# Patient Record
Sex: FEMALE | Race: WHITE | HISPANIC OR LATINO | Employment: FULL TIME | URBAN - METROPOLITAN AREA
[De-identification: names, ages, dates, MRNs, and addresses within clinical notes are randomized per-mention and may not be internally consistent; named-entity substitution may affect disease eponyms.]

---
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
## 2018-06-01 PROBLEM — F32.9 MAJOR DEPRESSION: Status: ACTIVE | Noted: 2018-06-01

## 2018-06-01 PROBLEM — F31.9 BIPOLAR DEPRESSION (HCC): Status: ACTIVE | Noted: 2018-06-01

## 2018-06-02 PROBLEM — E66.01 MORBID OBESITY (HCC): Status: ACTIVE | Noted: 2018-06-02

## 2018-06-02 PROBLEM — Z78.9 CONSUMES A VEGAN DIET: Status: ACTIVE | Noted: 2018-06-02

## 2018-06-04 PROBLEM — T78.40XA ALLERGY: Status: ACTIVE | Noted: 2018-06-04

## 2018-09-29 PROBLEM — F33.2 MAJOR DEPRESSIVE DISORDER, RECURRENT EPISODE, SEVERE (HCC): Status: ACTIVE | Noted: 2018-09-29

## 2018-09-29 PROBLEM — D64.9 ANEMIA, NORMOCYTIC NORMOCHROMIC: Status: ACTIVE | Noted: 2018-09-29

## 2018-09-29 PROBLEM — E66.9 OBESITY: Status: ACTIVE | Noted: 2018-06-02

## 2018-12-17 PROBLEM — F31.9 BIPOLAR DISORDER (HCC): Status: ACTIVE | Noted: 2018-12-17

## 2018-12-18 PROBLEM — F31.32 BIPOLAR AFFECTIVE DISORDER, CURRENTLY DEPRESSED, MODERATE (HCC): Status: ACTIVE | Noted: 2018-12-17

## 2018-12-18 PROBLEM — Z30.41 ORAL CONTRACEPTIVE USE: Status: ACTIVE | Noted: 2018-12-18

## 2021-07-22 PROBLEM — F31.9 BIPOLAR I DISORDER WITH DEPRESSION (HCC): Status: ACTIVE | Noted: 2021-07-22

## 2021-09-07 ENCOUNTER — HOSPITAL ENCOUNTER (EMERGENCY)
Age: 31
Discharge: PSYCHIATRIC OR SUBSTANCE USE FACILITY | End: 2021-09-08
Attending: EMERGENCY MEDICINE

## 2021-09-07 DIAGNOSIS — R45.851 SUICIDAL IDEATION: Primary | ICD-10-CM

## 2021-09-07 LAB
ALBUMIN SERPL-MCNC: 3.8 G/DL (ref 3.6–5.1)
ALBUMIN/GLOB SERPL: 1 {RATIO} (ref 1–2.4)
ALP SERPL-CCNC: 121 UNITS/L (ref 45–117)
ALT SERPL-CCNC: 103 UNITS/L
AMPHETAMINES UR QL SCN>500 NG/ML: NEGATIVE
ANION GAP SERPL CALC-SCNC: 15 MMOL/L (ref 10–20)
APPEARANCE UR: CLEAR
AST SERPL-CCNC: 60 UNITS/L
BACTERIA #/AREA URNS HPF: ABNORMAL /HPF
BARBITURATES UR QL SCN>200 NG/ML: NEGATIVE
BASOPHILS # BLD: 0 K/MCL (ref 0–0.3)
BASOPHILS NFR BLD: 0 %
BENZODIAZ UR QL SCN>200 NG/ML: NEGATIVE
BILIRUB SERPL-MCNC: 0.3 MG/DL (ref 0.2–1)
BILIRUB UR QL STRIP: NEGATIVE
BUN SERPL-MCNC: 12 MG/DL (ref 6–20)
BUN/CREAT SERPL: 17 (ref 7–25)
BZE UR QL SCN>150 NG/ML: NEGATIVE
CALCIUM SERPL-MCNC: 9 MG/DL (ref 8.4–10.2)
CANNABINOIDS UR QL SCN>50 NG/ML: NEGATIVE
CHLORIDE SERPL-SCNC: 105 MMOL/L (ref 98–107)
CO2 SERPL-SCNC: 25 MMOL/L (ref 21–32)
COLOR UR: YELLOW
CREAT SERPL-MCNC: 0.7 MG/DL (ref 0.51–0.95)
DEPRECATED RDW RBC: 39.2 FL (ref 39–50)
EOSINOPHIL # BLD: 0.1 K/MCL (ref 0–0.5)
EOSINOPHIL NFR BLD: 2 %
ERYTHROCYTE [DISTWIDTH] IN BLOOD: 13 % (ref 11–15)
ETHANOL SERPL-MCNC: NORMAL MG/DL
FASTING DURATION TIME PATIENT: ABNORMAL H
GFR SERPLBLD BASED ON 1.73 SQ M-ARVRAT: >90 ML/MIN
GLOBULIN SER-MCNC: 3.7 G/DL (ref 2–4)
GLUCOSE SERPL-MCNC: 104 MG/DL (ref 65–99)
GLUCOSE UR STRIP-MCNC: NEGATIVE MG/DL
HCT VFR BLD CALC: 39.7 % (ref 36–46.5)
HGB BLD-MCNC: 12.9 G/DL (ref 12–15.5)
HGB UR QL STRIP: ABNORMAL
HYALINE CASTS #/AREA URNS LPF: ABNORMAL /LPF
IMM GRANULOCYTES # BLD AUTO: 0.1 K/MCL (ref 0–0.2)
IMM GRANULOCYTES # BLD: 1 %
KETONES UR STRIP-MCNC: NEGATIVE MG/DL
LEUKOCYTE ESTERASE UR QL STRIP: NEGATIVE
LYMPHOCYTES # BLD: 3.4 K/MCL (ref 1–4.8)
LYMPHOCYTES NFR BLD: 40 %
MCH RBC QN AUTO: 27.3 PG (ref 26–34)
MCHC RBC AUTO-ENTMCNC: 32.5 G/DL (ref 32–36.5)
MCV RBC AUTO: 84.1 FL (ref 78–100)
MONOCYTES # BLD: 0.4 K/MCL (ref 0.3–0.9)
MONOCYTES NFR BLD: 5 %
NEUTROPHILS # BLD: 4.4 K/MCL (ref 1.8–7.7)
NEUTROPHILS NFR BLD: 52 %
NITRITE UR QL STRIP: NEGATIVE
NRBC BLD MANUAL-RTO: 0 /100 WBC
OPIATES UR QL SCN>300 NG/ML: NEGATIVE
PCP UR QL SCN>25 NG/ML: NEGATIVE
PH UR STRIP: 6.5 [PH] (ref 5–7)
PLATELET # BLD AUTO: 370 K/MCL (ref 140–450)
POTASSIUM SERPL-SCNC: 4.2 MMOL/L (ref 3.4–5.1)
PROT SERPL-MCNC: 7.5 G/DL (ref 6.4–8.2)
PROT UR STRIP-MCNC: 30 MG/DL
RBC # BLD: 4.72 MIL/MCL (ref 4–5.2)
RBC #/AREA URNS HPF: ABNORMAL /HPF
SARS-COV-2 RNA RESP QL NAA+PROBE: NOT DETECTED
SERVICE CMNT-IMP: NORMAL
SERVICE CMNT-IMP: NORMAL
SODIUM SERPL-SCNC: 141 MMOL/L (ref 135–145)
SP GR UR STRIP: 1.02 (ref 1–1.03)
SQUAMOUS #/AREA URNS HPF: ABNORMAL /HPF
UROBILINOGEN UR STRIP-MCNC: 0.2 MG/DL
WBC # BLD: 8.4 K/MCL (ref 4.2–11)
WBC #/AREA URNS HPF: ABNORMAL /HPF

## 2021-09-07 PROCEDURE — 85025 COMPLETE CBC W/AUTO DIFF WBC: CPT | Performed by: EMERGENCY MEDICINE

## 2021-09-07 PROCEDURE — 82077 ASSAY SPEC XCP UR&BREATH IA: CPT | Performed by: EMERGENCY MEDICINE

## 2021-09-07 PROCEDURE — 87635 SARS-COV-2 COVID-19 AMP PRB: CPT | Performed by: EMERGENCY MEDICINE

## 2021-09-07 PROCEDURE — 36415 COLL VENOUS BLD VENIPUNCTURE: CPT

## 2021-09-07 PROCEDURE — 80053 COMPREHEN METABOLIC PANEL: CPT | Performed by: EMERGENCY MEDICINE

## 2021-09-07 PROCEDURE — 99285 EMERGENCY DEPT VISIT HI MDM: CPT

## 2021-09-07 PROCEDURE — C9803 HOPD COVID-19 SPEC COLLECT: HCPCS

## 2021-09-07 PROCEDURE — 81001 URINALYSIS AUTO W/SCOPE: CPT | Performed by: EMERGENCY MEDICINE

## 2021-09-07 PROCEDURE — 80307 DRUG TEST PRSMV CHEM ANLYZR: CPT | Performed by: EMERGENCY MEDICINE

## 2021-09-07 RX ORDER — 0.9 % SODIUM CHLORIDE 0.9 %
2 VIAL (ML) INJECTION EVERY 12 HOURS SCHEDULED
Status: DISCONTINUED | OUTPATIENT
Start: 2021-09-07 | End: 2021-09-08 | Stop reason: HOSPADM

## 2021-09-08 VITALS
RESPIRATION RATE: 17 BRPM | WEIGHT: 288.8 LBS | HEIGHT: 66 IN | HEART RATE: 80 BPM | BODY MASS INDEX: 46.41 KG/M2 | TEMPERATURE: 97.8 F | OXYGEN SATURATION: 100 % | DIASTOLIC BLOOD PRESSURE: 82 MMHG | SYSTOLIC BLOOD PRESSURE: 142 MMHG

## 2021-09-08 PROBLEM — F31.4 SEVERE DEPRESSED BIPOLAR I DISORDER WITHOUT PSYCHOTIC FEATURES (HCC): Status: ACTIVE | Noted: 2018-12-17

## 2021-09-08 ASSESSMENT — COGNITIVE AND FUNCTIONAL STATUS - GENERAL
ORIENTATION: ORIENTED (PERSON/PLACE/TIME)
ATTENTION_CALCULATED: MAINTAINS ATTENTION
BEHAVIOR: APPROPRIATE TO SITUATION
MOTOR_BEHAVIOR-AGITATION_CALCULATED: CALM AND PURPOSEFUL
MOOD: UNREMARKABLE
AFFECT: APPROPRIATE TO SITUATION
PERCEPTUAL_MISINTERPRETATIONS_HALLUCINATIONS: CLEAR REALITY BASED PERCEPTIONS
MOTOR_BEHAVIOR-RETARDATION_CALCULATED: CALM AND PURPOSEFUL
MEMORY: INTACT
SPEECH: CLEAR/UNDERSTANDABLE
LEVEL_OF_CONSCIOUSNESS_CALCULATED: ALERT

## 2021-09-08 ASSESSMENT — PAIN SCALES - GENERAL
PAINLEVEL_OUTOF10: 0
PAINLEVEL_OUTOF10: 0

## 2021-09-22 PROBLEM — F31.9 BIPOLAR DEPRESSION (HCC): Status: RESOLVED | Noted: 2018-06-01 | Resolved: 2021-09-22

## 2021-09-22 PROBLEM — D64.9 ANEMIA, NORMOCYTIC NORMOCHROMIC: Status: RESOLVED | Noted: 2018-09-29 | Resolved: 2021-09-22

## 2021-09-22 PROBLEM — F31.9 BIPOLAR I DISORDER WITH DEPRESSION (HCC): Status: RESOLVED | Noted: 2021-07-22 | Resolved: 2021-09-22

## 2021-09-22 NOTE — PROGRESS NOTES
Wellness Exam    CC: Patient is presenting for a wellness exam    HPI:   Concerns: new to our office. Currently in intensive outpatient treatment for bipolar with severe depression, post suicidal ideation.  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 , Disp: 60 tablet, Rfl: 0  traZODone 50 MG Oral Tab, Take 1 tablet (50 mg total) by mouth nightly as needed for Sleep., Mague Noriega friction rub heard. Pulmonary/Chest: Effort normal and breath sounds normal bilaterally. She has no wheezes or rales. Breasts: deferred  Abdominal: Soft. Bowel sounds are normal. There is no tenderness. No HSM. Musculoskeletal: Normal range of motion. diet  Patient/Caregiver Education:  Patient/Caregiver Education: There are no barriers to learning. Medical education done. Outcome: Patient verbalizes understanding.        Educated by: EDDA Juarez

## 2021-09-27 NOTE — PROGRESS NOTES
HPI:    Patient ID: Deedee Jiemnez is a 27year old female. Patient presents with:  Immunization/Injection: update immunizations      She is in need for immunization. We reviewed labs today.  She did discuss elevation with psychiatrist. Take 1 tablet (300 mg total) by mouth 2 (two) times daily. 60 tablet 0   • traZODone 50 MG Oral Tab Take 1 tablet (50 mg total) by mouth nightly as needed for Sleep. 30 tablet 0   • METHYLFOLATE OR Take by mouth.        Allergies:  Dander                  A SpO2 96%   BMI 48.26 kg/m²   Physical Exam  Vitals and nursing note reviewed. Constitutional:       Appearance: She is obese. Cardiovascular:      Rate and Rhythm: Normal rate and regular rhythm.    Pulmonary:      Effort: Pulmonary effort is normal.

## 2021-10-06 NOTE — TELEPHONE ENCOUNTER
Received fax from [facility] update on request has been approved    Paper placed in triage

## 2021-10-14 NOTE — PROGRESS NOTES
Discussed results with pt. Understanding expressed. She was advised that Dr. Ramandeep Camargo' office will be in contact with her.

## 2022-02-02 ENCOUNTER — WALK IN (OUTPATIENT)
Dept: URGENT CARE | Age: 32
End: 2022-02-02
Attending: EMERGENCY MEDICINE

## 2022-02-02 ENCOUNTER — HOSPITAL ENCOUNTER (OUTPATIENT)
Dept: GENERAL RADIOLOGY | Age: 32
Discharge: HOME | End: 2022-02-02
Attending: EMERGENCY MEDICINE

## 2022-02-02 VITALS
RESPIRATION RATE: 20 BRPM | HEIGHT: 65 IN | OXYGEN SATURATION: 99 % | SYSTOLIC BLOOD PRESSURE: 118 MMHG | BODY MASS INDEX: 48.32 KG/M2 | WEIGHT: 290 LBS | TEMPERATURE: 97.9 F | DIASTOLIC BLOOD PRESSURE: 78 MMHG | HEART RATE: 105 BPM

## 2022-02-02 DIAGNOSIS — J06.9 VIRAL URI: ICD-10-CM

## 2022-02-02 DIAGNOSIS — R05.9 COUGH: Primary | ICD-10-CM

## 2022-02-02 DIAGNOSIS — R05.9 COUGH: ICD-10-CM

## 2022-02-02 PROCEDURE — 71046 X-RAY EXAM CHEST 2 VIEWS: CPT

## 2022-02-02 PROCEDURE — 99213 OFFICE O/P EST LOW 20 MIN: CPT

## 2022-02-02 RX ORDER — BUPROPION HYDROCHLORIDE 150 MG/1
150 TABLET ORAL EVERY MORNING
COMMUNITY
Start: 2022-01-31 | End: 2023-06-07 | Stop reason: ALTCHOICE

## 2022-02-02 RX ORDER — LITHIUM CARBONATE 300 MG/1
450 TABLET, FILM COATED, EXTENDED RELEASE ORAL DAILY
COMMUNITY
Start: 2022-01-31

## 2022-02-02 RX ORDER — LAMOTRIGINE 200 MG/1
200 TABLET ORAL DAILY
COMMUNITY
Start: 2022-01-31

## 2022-02-02 RX ORDER — GABAPENTIN 300 MG/1
300 CAPSULE ORAL PRN
COMMUNITY
Start: 2021-09-23

## 2022-02-02 RX ORDER — BENZONATATE 100 MG/1
200 CAPSULE ORAL 3 TIMES DAILY PRN
Qty: 20 CAPSULE | Refills: 0 | Status: SHIPPED | OUTPATIENT
Start: 2022-02-02 | End: 2023-06-07 | Stop reason: ALTCHOICE

## 2022-02-02 ASSESSMENT — ENCOUNTER SYMPTOMS
SHORTNESS OF BREATH: 0
CONFUSION: 0
ABDOMINAL PAIN: 0
COUGH: 1
CHILLS: 0
BACK PAIN: 0
HEADACHES: 0
FEVER: 0
SINUS PAIN: 0
VOMITING: 0
NAUSEA: 0

## 2022-10-10 PROBLEM — Z30.41 ORAL CONTRACEPTIVE USE: Status: RESOLVED | Noted: 2018-12-18 | Resolved: 2022-10-10

## 2022-12-17 PROBLEM — R79.89 ELEVATED LFTS: Status: ACTIVE | Noted: 2022-12-17

## 2022-12-17 PROBLEM — E11.9 TYPE 2 DIABETES MELLITUS WITHOUT COMPLICATION, WITHOUT LONG-TERM CURRENT USE OF INSULIN (HCC): Status: ACTIVE | Noted: 2022-12-17

## 2022-12-18 NOTE — TELEPHONE ENCOUNTER
Pt paged to let us know that Verlon Girt was $170/month. Switched Verlon Girt to [place]. Pt will update the office if the medication is too expensive.

## 2022-12-19 NOTE — TELEPHONE ENCOUNTER
Per Jeni Kussmaul, APN: The itchiness is most likely related to her high sugars. Once we get the sugars under control it should provide relief. Could finish the metrinidazole gel to see if it provides some relief. Pt notified.

## 2022-12-19 NOTE — TELEPHONE ENCOUNTER
From: Roger Estes  To: GERALD Pearson  Sent: 12/18/2022 2:21 PM CST  Subject: Question regarding VAGINITIS/VAGINOSIS, DNA PROBE    Since everything came out negative, should I still take the medication?

## 2023-01-06 NOTE — TELEPHONE ENCOUNTER
From: Dorian Escalante  To: GERALD Oh  Sent: 1/5/2023 6:56 PM CST  Subject: When do I make an appointment? I remember I was told to make an appointment to check to see if the dosage of metformin was working.  I don't remember if it was 2 weeks or 2 months

## 2023-01-06 NOTE — TELEPHONE ENCOUNTER
From: Jeff Bonds  Sent: 1/6/2023 10:16 AM CST  To: [facility] Clinical Staff  Subject: When do I make an appointment? Yes, please. It would help me out a lot. Thank you so much!

## 2023-01-06 NOTE — TELEPHONE ENCOUNTER
empagliflozin 10 MG Oral Tab  Last time medication was refilled 12/18/22  Quantity and # of refills 30 tab  Last OV 12/16/22  Next OV no appt scheduled

## 2023-01-06 NOTE — TELEPHONE ENCOUNTER
From: Dorian Escalante  Sent: 1/6/2023 9:11 AM CST  To: [facility] Clinical Staff  Subject: When do I make an appointment?     Do I have to make another appointment to get more jardiance

## 2023-02-10 NOTE — TELEPHONE ENCOUNTER
Per Aliyah BAÑUELOS jardiance most likely cause as it gets rid of sugar through the urine, complete labs mid march    D/w pt above she expressed understanding.

## 2023-03-02 PROBLEM — N92.0 MENORRHAGIA WITH REGULAR CYCLE: Status: ACTIVE | Noted: 2023-03-02

## 2023-03-02 PROBLEM — N93.9 ABNORMAL UTERINE BLEEDING (AUB): Status: ACTIVE | Noted: 2023-03-02

## 2023-03-02 PROBLEM — N94.10 DYSPAREUNIA, FEMALE: Status: ACTIVE | Noted: 2023-03-02

## 2023-03-02 PROBLEM — D25.9 UTERINE LEIOMYOMA: Status: ACTIVE | Noted: 2023-03-02

## 2023-03-06 NOTE — TELEPHONE ENCOUNTER
Vaginal IUD results reviewed. Noted for positive bacterial vaginosis. Treatment provided. [employer] message sent to patient.

## 2023-03-06 NOTE — TELEPHONE ENCOUNTER
Stephanie Portillo,    Your screening for vaginal infections was positive for bacterial vaginosis but negative for other infections. Recall, that this infection is not sexually transmitted but can cause symptoms that you reported during your last office exam. Therefore, I would advise to start treatment with metronidazole 500 mg by mouth twice daily for 7 days. A new prescription has been sent to your pharmacy. Please do not consume alcohol during the use of this medication. Please contact the office if you have further questions or concerns. Thank you.      Sincerely,   Dr. Jayshree Ordonez

## 2023-03-13 NOTE — TELEPHONE ENCOUNTER
Patient taking OCP a little over a week. Patient is passing tampon sized clots. Patient is getting cramping and period lightens up and then she will pass a clot and then bleeding increases again. Patient was bleeding when started OCP. Patient states had been bleeding 2-3 weeks total. Patient is changing pad/tampon 4 times a day. Advised on bleeding precautions. Advised patient it will take time for OCP to work and for the bleeding to decrease or stop. Any further recommendations for patient?

## 2023-03-13 NOTE — TELEPHONE ENCOUNTER
Pt has been goinng back and forth on mychart and called now to say pain/ bleeding not improving. Last message on mychart is below    Hi. Yes, I'm still bleeding with varying degrees of pain, flow, and clot amount.  Katharine Adamson been on birth control a week and I take it every day at the same time

## 2023-03-14 NOTE — TELEPHONE ENCOUNTER
Agree. It will take time for the OCP to work and I recommend for the patient to follow-up as recommended with minimally invasive gynecological surgery for management of her large uterine fibroid. Recommend ibuprofen 600 mg every 6 hours as needed with food during her heavy bleeding.

## 2023-03-14 NOTE — TELEPHONE ENCOUNTER
Contacted patient. She is feeling a little bit better today. Reviewed rational for ibuprofen for bleeding and pain and what to expect with OCP. Questions answered regarding this as well as surgery. She has a consult with Dr. Nova Noel scheduled for April. ER/bleeding precautions given and patient states understanding.

## 2023-03-21 PROBLEM — R80.9 MICROALBUMINURIA: Status: ACTIVE | Noted: 2023-03-21

## 2023-03-22 NOTE — TELEPHONE ENCOUNTER
Per pt can't afford the Olmesartan as the $$$$ is too high    Would like to know about any alt options    [facility] [address], [address]  [id number] [facility], [phone], [phone]

## 2023-03-22 NOTE — TELEPHONE ENCOUNTER
NATALIA DUNCAN.     Rajni Quispe [facility] option:   [address] 30 day supply for roughly $1.32  [facility] 30 day supply for roughly $3.35

## 2023-03-24 NOTE — TELEPHONE ENCOUNTER
Spoke with pt she did  medication as family helped her pay for it. D/w pt [facility] website for next refill she expressed understanding and will let the office know where she would like rx sent.

## 2023-06-07 RX ORDER — ARIPIPRAZOLE 5 MG/1
5 TABLET ORAL DAILY
COMMUNITY

## 2023-06-07 RX ORDER — DEXMETHYLPHENIDATE HYDROCHLORIDE 10 MG/1
10 CAPSULE, EXTENDED RELEASE ORAL DAILY
COMMUNITY

## 2023-06-07 RX ORDER — METFORMIN HYDROCHLORIDE 500 MG/1
TABLET, EXTENDED RELEASE ORAL
COMMUNITY
Start: 2022-12-17

## 2023-06-07 RX ORDER — OLMESARTAN MEDOXOMIL 5 MG/1
5 TABLET ORAL DAILY
COMMUNITY
Start: 2023-03-22

## 2023-06-07 RX ORDER — ROSUVASTATIN CALCIUM 20 MG/1
20 TABLET, COATED ORAL DAILY
COMMUNITY
Start: 2022-12-17

## 2023-06-07 ASSESSMENT — COGNITIVE AND FUNCTIONAL STATUS - GENERAL
ARE YOU BLIND OR DO YOU HAVE SERIOUS DIFFICULTY SEEING, EVEN WHEN WEARING GLASSES: NO
ARE YOU DEAF OR DO YOU HAVE SERIOUS DIFFICULTY  HEARING: NO

## 2023-06-07 ASSESSMENT — ACTIVITIES OF DAILY LIVING (ADL)
NEEDS_ASSIST: NO
CHRONIC_PAIN_PRESENT: NO
ADL_SCORE: 12
SENSORY_SUPPORT_DEVICES: EYEGLASSES
ADL_BEFORE_ADMISSION: INDEPENDENT
HISTORY OF FALLING IN THE LAST YEAR (PRIOR TO ADMISSION): NO
ADL_SHORT_OF_BREATH: NO
RECENT_DECLINE_ADL: NO

## 2023-06-08 ENCOUNTER — HOSPITAL ENCOUNTER (OUTPATIENT)
Age: 33
Discharge: HOME | End: 2023-06-08
Attending: OBSTETRICS & GYNECOLOGY | Admitting: OBSTETRICS & GYNECOLOGY

## 2023-06-08 ENCOUNTER — ANESTHESIA EVENT (OUTPATIENT)
Dept: SURGERY | Age: 33
End: 2023-06-08

## 2023-06-08 ENCOUNTER — ANESTHESIA (OUTPATIENT)
Dept: SURGERY | Age: 33
End: 2023-06-08

## 2023-06-08 VITALS
OXYGEN SATURATION: 92 % | RESPIRATION RATE: 16 BRPM | HEART RATE: 104 BPM | DIASTOLIC BLOOD PRESSURE: 74 MMHG | WEIGHT: 250.88 LBS | BODY MASS INDEX: 40.32 KG/M2 | SYSTOLIC BLOOD PRESSURE: 151 MMHG | HEIGHT: 66 IN | TEMPERATURE: 96.8 F

## 2023-06-08 DIAGNOSIS — Z98.890 S/P LAPAROSCOPY: Primary | ICD-10-CM

## 2023-06-08 LAB
B-HCG UR QL: NEGATIVE
GLUCOSE BLDC GLUCOMTR-MCNC: 177 MG/DL (ref 70–99)
GLUCOSE BLDC GLUCOMTR-MCNC: 182 MG/DL (ref 70–99)
GLUCOSE BLDC GLUCOMTR-MCNC: 86 MG/DL (ref 70–99)
GLUCOSE BLDC GLUCOMTR-MCNC: 96 MG/DL (ref 70–99)
INTERNAL PROCEDURAL CONTROLS ACCEPTABLE: YES
TEST LOT EXPIRATION DATE: NORMAL
TEST LOT NUMBER: NORMAL

## 2023-06-08 PROCEDURE — 10006023 HB SUPPLY 272: Performed by: OBSTETRICS & GYNECOLOGY

## 2023-06-08 PROCEDURE — 10002803 HB RX 637: Performed by: STUDENT IN AN ORGANIZED HEALTH CARE EDUCATION/TRAINING PROGRAM

## 2023-06-08 PROCEDURE — 13000001 HB PHASE II RECOVERY EA 30 MINUTES: Performed by: OBSTETRICS & GYNECOLOGY

## 2023-06-08 PROCEDURE — 10002807 HB RX 258: Performed by: OBSTETRICS & GYNECOLOGY

## 2023-06-08 PROCEDURE — 82962 GLUCOSE BLOOD TEST: CPT

## 2023-06-08 PROCEDURE — 10002807 HB RX 258: Performed by: ANESTHESIOLOGY

## 2023-06-08 PROCEDURE — 10002803 HB RX 637: Performed by: ANESTHESIOLOGY

## 2023-06-08 PROCEDURE — 10002801 HB RX 250 W/O HCPCS: Performed by: NURSE ANESTHETIST, CERTIFIED REGISTERED

## 2023-06-08 PROCEDURE — C1765 ADHESION BARRIER: HCPCS | Performed by: OBSTETRICS & GYNECOLOGY

## 2023-06-08 PROCEDURE — 10004452 HB PACU ADDL 30 MINUTES: Performed by: OBSTETRICS & GYNECOLOGY

## 2023-06-08 PROCEDURE — 10002801 HB RX 250 W/O HCPCS: Performed by: ANESTHESIOLOGY

## 2023-06-08 PROCEDURE — 10006027 HB SUPPLY 278: Performed by: OBSTETRICS & GYNECOLOGY

## 2023-06-08 PROCEDURE — 81025 URINE PREGNANCY TEST: CPT | Performed by: ANESTHESIOLOGY

## 2023-06-08 PROCEDURE — 10002801 HB RX 250 W/O HCPCS: Performed by: OBSTETRICS & GYNECOLOGY

## 2023-06-08 PROCEDURE — 13000002 HB ANESTHESIA  GENERAL  S/U + 1ST 15 MIN: Performed by: OBSTETRICS & GYNECOLOGY

## 2023-06-08 PROCEDURE — 10002800 HB RX 250 W HCPCS: Performed by: ANESTHESIOLOGY

## 2023-06-08 PROCEDURE — 10004451 HB PACU RECOVERY 1ST 30 MINUTES: Performed by: OBSTETRICS & GYNECOLOGY

## 2023-06-08 PROCEDURE — 10002800 HB RX 250 W HCPCS: Performed by: STUDENT IN AN ORGANIZED HEALTH CARE EDUCATION/TRAINING PROGRAM

## 2023-06-08 PROCEDURE — 10004651 HB RX, NO CHARGE ITEM: Performed by: ANESTHESIOLOGY

## 2023-06-08 PROCEDURE — 13000098 HB GENERAL ROBOTIC CASE S/U + 1ST 15 MIN: Performed by: OBSTETRICS & GYNECOLOGY

## 2023-06-08 PROCEDURE — 13000003 HB ANESTHESIA  GENERAL EA ADD MINUTE: Performed by: OBSTETRICS & GYNECOLOGY

## 2023-06-08 PROCEDURE — 13000099 HB GENERAL ROBOTIC CASE EA ADD MINUTE: Performed by: OBSTETRICS & GYNECOLOGY

## 2023-06-08 PROCEDURE — 88305 TISSUE EXAM BY PATHOLOGIST: CPT | Performed by: OBSTETRICS & GYNECOLOGY

## 2023-06-08 DEVICE — ABSORBABLE ADHESION BARRIER
Type: IMPLANTABLE DEVICE | Site: UTERUS | Status: FUNCTIONAL
Brand: GYNECARE INTERCEED

## 2023-06-08 RX ORDER — ACETAMINOPHEN 650 MG/1
650 SUPPOSITORY RECTAL EVERY 4 HOURS PRN
Status: DISCONTINUED | OUTPATIENT
Start: 2023-06-08 | End: 2023-06-12 | Stop reason: HOSPADM

## 2023-06-08 RX ORDER — LIDOCAINE HYDROCHLORIDE 10 MG/ML
INJECTION, SOLUTION INFILTRATION; PERINEURAL PRN
Status: DISCONTINUED | OUTPATIENT
Start: 2023-06-08 | End: 2023-06-08

## 2023-06-08 RX ORDER — HYDRALAZINE HYDROCHLORIDE 20 MG/ML
5 INJECTION INTRAMUSCULAR; INTRAVENOUS EVERY 10 MIN PRN
Status: DISCONTINUED | OUTPATIENT
Start: 2023-06-08 | End: 2023-06-12 | Stop reason: HOSPADM

## 2023-06-08 RX ORDER — OXYCODONE HYDROCHLORIDE 5 MG/1
5 TABLET ORAL EVERY 6 HOURS PRN
Qty: 14 TABLET | Refills: 0 | Status: SHIPPED | OUTPATIENT
Start: 2023-06-08 | End: 2023-06-13

## 2023-06-08 RX ORDER — PROCHLORPERAZINE EDISYLATE 5 MG/ML
5 INJECTION INTRAMUSCULAR; INTRAVENOUS EVERY 4 HOURS PRN
Status: DISCONTINUED | OUTPATIENT
Start: 2023-06-08 | End: 2023-06-12 | Stop reason: HOSPADM

## 2023-06-08 RX ORDER — HYDROCODONE BITARTRATE AND ACETAMINOPHEN 5; 325 MG/1; MG/1
1 TABLET ORAL EVERY 4 HOURS PRN
Status: DISCONTINUED | OUTPATIENT
Start: 2023-06-08 | End: 2023-06-12 | Stop reason: HOSPADM

## 2023-06-08 RX ORDER — MIDAZOLAM HYDROCHLORIDE 1 MG/ML
2 INJECTION, SOLUTION INTRAMUSCULAR; INTRAVENOUS ONCE
Status: COMPLETED | OUTPATIENT
Start: 2023-06-08 | End: 2023-06-08

## 2023-06-08 RX ORDER — SODIUM CHLORIDE, SODIUM LACTATE, POTASSIUM CHLORIDE, CALCIUM CHLORIDE 600; 310; 30; 20 MG/100ML; MG/100ML; MG/100ML; MG/100ML
INJECTION, SOLUTION INTRAVENOUS CONTINUOUS PRN
Status: DISCONTINUED | OUTPATIENT
Start: 2023-06-08 | End: 2023-06-08

## 2023-06-08 RX ORDER — IBUPROFEN 200 MG
600 TABLET ORAL EVERY 6 HOURS PRN
Status: DISCONTINUED | OUTPATIENT
Start: 2023-06-08 | End: 2023-06-12 | Stop reason: HOSPADM

## 2023-06-08 RX ORDER — MAGNESIUM HYDROXIDE 1200 MG/15ML
LIQUID ORAL PRN
Status: DISCONTINUED | OUTPATIENT
Start: 2023-06-08 | End: 2023-06-08 | Stop reason: HOSPADM

## 2023-06-08 RX ORDER — ONDANSETRON 2 MG/ML
INJECTION INTRAMUSCULAR; INTRAVENOUS PRN
Status: DISCONTINUED | OUTPATIENT
Start: 2023-06-08 | End: 2023-06-08

## 2023-06-08 RX ORDER — HUMAN INSULIN 100 [IU]/ML
INJECTION, SOLUTION SUBCUTANEOUS
Status: DISCONTINUED | OUTPATIENT
Start: 2023-06-08 | End: 2023-06-12 | Stop reason: HOSPADM

## 2023-06-08 RX ORDER — IPRATROPIUM BROMIDE AND ALBUTEROL SULFATE 2.5; .5 MG/3ML; MG/3ML
3 SOLUTION RESPIRATORY (INHALATION) ONCE
Status: COMPLETED | OUTPATIENT
Start: 2023-06-08 | End: 2023-06-08

## 2023-06-08 RX ORDER — PROPOFOL 10 MG/ML
INJECTION, EMULSION INTRAVENOUS PRN
Status: DISCONTINUED | OUTPATIENT
Start: 2023-06-08 | End: 2023-06-08

## 2023-06-08 RX ORDER — IBUPROFEN 600 MG/1
600 TABLET ORAL EVERY 6 HOURS PRN
Qty: 100 TABLET | Refills: 1 | Status: SHIPPED | OUTPATIENT
Start: 2023-06-08

## 2023-06-08 RX ORDER — ROCURONIUM BROMIDE 10 MG/ML
INJECTION, SOLUTION INTRAVENOUS PRN
Status: DISCONTINUED | OUTPATIENT
Start: 2023-06-08 | End: 2023-06-08

## 2023-06-08 RX ORDER — ONDANSETRON 4 MG/1
4 TABLET, ORALLY DISINTEGRATING ORAL
Status: ACTIVE | OUTPATIENT
Start: 2023-06-08 | End: 2023-06-08

## 2023-06-08 RX ORDER — KETOROLAC TROMETHAMINE 15 MG/ML
15 INJECTION, SOLUTION INTRAMUSCULAR; INTRAVENOUS EVERY 6 HOURS PRN
Status: DISCONTINUED | OUTPATIENT
Start: 2023-06-08 | End: 2023-06-12 | Stop reason: HOSPADM

## 2023-06-08 RX ORDER — AMOXICILLIN AND CLAVULANATE POTASSIUM 875; 125 MG/1; MG/1
1 TABLET, FILM COATED ORAL 2 TIMES DAILY
Qty: 14 TABLET | Refills: 0 | Status: SHIPPED | OUTPATIENT
Start: 2023-06-08 | End: 2023-06-15

## 2023-06-08 RX ORDER — PHENAZOPYRIDINE HYDROCHLORIDE 200 MG/1
200 TABLET, FILM COATED ORAL ONCE
Status: COMPLETED | OUTPATIENT
Start: 2023-06-08 | End: 2023-06-08

## 2023-06-08 RX ORDER — BUPIVACAINE HYDROCHLORIDE 5 MG/ML
INJECTION, SOLUTION EPIDURAL; INTRACAUDAL PRN
Status: DISCONTINUED | OUTPATIENT
Start: 2023-06-08 | End: 2023-06-08 | Stop reason: HOSPADM

## 2023-06-08 RX ORDER — CHLORHEXIDINE GLUCONATE ORAL RINSE 1.2 MG/ML
15 SOLUTION DENTAL ONCE
Status: COMPLETED | OUTPATIENT
Start: 2023-06-08 | End: 2023-06-08

## 2023-06-08 RX ORDER — ACETAMINOPHEN 325 MG/1
650 TABLET ORAL EVERY 4 HOURS PRN
Qty: 100 TABLET | Refills: 1 | Status: SHIPPED | OUTPATIENT
Start: 2023-06-08

## 2023-06-08 RX ORDER — ONDANSETRON 4 MG/1
4 TABLET, ORALLY DISINTEGRATING ORAL EVERY 12 HOURS PRN
Status: DISCONTINUED | OUTPATIENT
Start: 2023-06-08 | End: 2023-06-12 | Stop reason: HOSPADM

## 2023-06-08 RX ORDER — SODIUM CHLORIDE, SODIUM LACTATE, POTASSIUM CHLORIDE, CALCIUM CHLORIDE 600; 310; 30; 20 MG/100ML; MG/100ML; MG/100ML; MG/100ML
10 INJECTION, SOLUTION INTRAVENOUS CONTINUOUS
Status: DISCONTINUED | OUTPATIENT
Start: 2023-06-08 | End: 2023-06-08 | Stop reason: HOSPADM

## 2023-06-08 RX ORDER — DIPHENHYDRAMINE HYDROCHLORIDE 50 MG/ML
25 INJECTION INTRAMUSCULAR; INTRAVENOUS EVERY 4 HOURS PRN
Status: DISCONTINUED | OUTPATIENT
Start: 2023-06-08 | End: 2023-06-12 | Stop reason: HOSPADM

## 2023-06-08 RX ORDER — DIPHENHYDRAMINE HYDROCHLORIDE 50 MG/ML
12.5 INJECTION INTRAMUSCULAR; INTRAVENOUS
Status: DISCONTINUED | OUTPATIENT
Start: 2023-06-08 | End: 2023-06-12 | Stop reason: HOSPADM

## 2023-06-08 RX ORDER — ACETAMINOPHEN 325 MG/1
650 TABLET ORAL EVERY 4 HOURS PRN
Status: DISCONTINUED | OUTPATIENT
Start: 2023-06-08 | End: 2023-06-12 | Stop reason: HOSPADM

## 2023-06-08 RX ORDER — SENNA AND DOCUSATE SODIUM 50; 8.6 MG/1; MG/1
1 TABLET, FILM COATED ORAL DAILY
Qty: 100 TABLET | Refills: 1 | Status: SHIPPED | OUTPATIENT
Start: 2023-06-08

## 2023-06-08 RX ORDER — DEXAMETHASONE SODIUM PHOSPHATE 4 MG/ML
INJECTION, SOLUTION INTRA-ARTICULAR; INTRALESIONAL; INTRAMUSCULAR; INTRAVENOUS; SOFT TISSUE PRN
Status: DISCONTINUED | OUTPATIENT
Start: 2023-06-08 | End: 2023-06-08

## 2023-06-08 RX ORDER — ONDANSETRON 2 MG/ML
4 INJECTION INTRAMUSCULAR; INTRAVENOUS EVERY 12 HOURS PRN
Status: DISCONTINUED | OUTPATIENT
Start: 2023-06-08 | End: 2023-06-12 | Stop reason: HOSPADM

## 2023-06-08 RX ORDER — ONDANSETRON 8 MG/1
8 TABLET, ORALLY DISINTEGRATING ORAL EVERY 8 HOURS PRN
Qty: 10 TABLET | Refills: 0 | Status: SHIPPED | OUTPATIENT
Start: 2023-06-08

## 2023-06-08 RX ORDER — ONDANSETRON 2 MG/ML
4 INJECTION INTRAMUSCULAR; INTRAVENOUS 2 TIMES DAILY PRN
Status: DISCONTINUED | OUTPATIENT
Start: 2023-06-08 | End: 2023-06-12 | Stop reason: HOSPADM

## 2023-06-08 RX ORDER — ACETAMINOPHEN 500 MG
1000 TABLET ORAL ONCE
Status: COMPLETED | OUTPATIENT
Start: 2023-06-08 | End: 2023-06-08

## 2023-06-08 RX ADMIN — HYDROCODONE BITARTRATE AND ACETAMINOPHEN 1 TABLET: 5; 325 TABLET ORAL at 17:41

## 2023-06-08 RX ADMIN — KETOROLAC TROMETHAMINE 15 MG: 15 INJECTION, SOLUTION INTRAMUSCULAR; INTRAVENOUS at 17:37

## 2023-06-08 RX ADMIN — CEFAZOLIN SODIUM 2000 MG: 300 INJECTION, POWDER, LYOPHILIZED, FOR SOLUTION INTRAVENOUS at 14:43

## 2023-06-08 RX ADMIN — PROPOFOL 200 MG: 10 INJECTION, EMULSION INTRAVENOUS at 14:38

## 2023-06-08 RX ADMIN — FENTANYL CITRATE 50 MCG: 50 INJECTION, SOLUTION INTRAMUSCULAR; INTRAVENOUS at 15:11

## 2023-06-08 RX ADMIN — IPRATROPIUM BROMIDE AND ALBUTEROL SULFATE 3 ML: 2.5; .5 SOLUTION RESPIRATORY (INHALATION) at 19:45

## 2023-06-08 RX ADMIN — FENTANYL CITRATE 50 MCG: 50 INJECTION, SOLUTION INTRAMUSCULAR; INTRAVENOUS at 16:19

## 2023-06-08 RX ADMIN — ROCURONIUM BROMIDE 10 MG: 10 INJECTION, SOLUTION INTRAVENOUS at 16:19

## 2023-06-08 RX ADMIN — SODIUM CHLORIDE, POTASSIUM CHLORIDE, SODIUM LACTATE AND CALCIUM CHLORIDE: 600; 310; 30; 20 INJECTION, SOLUTION INTRAVENOUS at 14:28

## 2023-06-08 RX ADMIN — DEXAMETHASONE SODIUM PHOSPHATE 4 MG: 4 INJECTION, SOLUTION INTRAMUSCULAR; INTRAVENOUS at 14:55

## 2023-06-08 RX ADMIN — SODIUM CHLORIDE, POTASSIUM CHLORIDE, SODIUM LACTATE AND CALCIUM CHLORIDE: 600; 310; 30; 20 INJECTION, SOLUTION INTRAVENOUS at 16:15

## 2023-06-08 RX ADMIN — ACETAMINOPHEN 1000 MG: 500 TABLET ORAL at 12:06

## 2023-06-08 RX ADMIN — ROCURONIUM BROMIDE 50 MG: 10 INJECTION, SOLUTION INTRAVENOUS at 14:38

## 2023-06-08 RX ADMIN — MIDAZOLAM 2 MG: 1 INJECTION INTRAMUSCULAR; INTRAVENOUS at 14:24

## 2023-06-08 RX ADMIN — CHLORHEXIDINE GLUCONATE 15 ML: 1.2 RINSE ORAL at 12:06

## 2023-06-08 RX ADMIN — FENTANYL CITRATE 50 MCG: 50 INJECTION, SOLUTION INTRAMUSCULAR; INTRAVENOUS at 15:39

## 2023-06-08 RX ADMIN — SUGAMMADEX 250 MG: 100 INJECTION, SOLUTION INTRAVENOUS at 16:34

## 2023-06-08 RX ADMIN — FENTANYL CITRATE 100 MCG: 50 INJECTION, SOLUTION INTRAMUSCULAR; INTRAVENOUS at 14:49

## 2023-06-08 RX ADMIN — ONDANSETRON 4 MG: 2 INJECTION INTRAMUSCULAR; INTRAVENOUS at 14:55

## 2023-06-08 RX ADMIN — LIDOCAINE HYDROCHLORIDE 50 MG: 10 INJECTION, SOLUTION INFILTRATION; PERINEURAL at 14:38

## 2023-06-08 RX ADMIN — PHENAZOPYRIDINE 200 MG: 200 TABLET ORAL at 12:06

## 2023-06-08 RX ADMIN — FENTANYL CITRATE 25 MCG: 50 INJECTION INTRAMUSCULAR; INTRAVENOUS at 17:08

## 2023-06-08 RX ADMIN — ROCURONIUM BROMIDE 10 MG: 10 INJECTION, SOLUTION INTRAVENOUS at 15:38

## 2023-06-08 SDOH — SOCIAL STABILITY: SOCIAL INSECURITY: RISK FACTORS: BMI> 30 (OBESITY)

## 2023-06-08 SDOH — SOCIAL STABILITY: SOCIAL INSECURITY: RISK FACTORS: SLEEP APNEA

## 2023-06-08 ASSESSMENT — PAIN SCALES - GENERAL
PAINLEVEL_OUTOF10: 6
PAINLEVEL_OUTOF10: 5
PAINLEVEL_OUTOF10: 8
PAINLEVEL_OUTOF10: 7
PAINLEVEL_OUTOF10: 7
PAINLEVEL_OUTOF10: 9

## 2023-06-12 PROBLEM — N94.10 DYSPAREUNIA, FEMALE: Status: RESOLVED | Noted: 2023-03-02 | Resolved: 2023-06-12

## 2023-06-12 PROBLEM — Z78.9 CONSUMES A VEGAN DIET: Status: RESOLVED | Noted: 2018-06-02 | Resolved: 2023-06-12

## 2023-06-12 LAB
ASR DISCLAIMER: NORMAL
CASE RPRT: NORMAL
CLINICAL INFO: NORMAL
PATH REPORT.FINAL DX SPEC: NORMAL
PATH REPORT.GROSS SPEC: NORMAL

## 2023-06-13 PROBLEM — N80.9 ENDOMETRIOSIS: Status: ACTIVE | Noted: 2023-06-13

## 2023-06-13 PROBLEM — N84.0 ENDOMETRIAL POLYP: Status: ACTIVE | Noted: 2023-06-13

## 2023-07-13 PROBLEM — N93.9 ABNORMAL UTERINE BLEEDING (AUB): Status: RESOLVED | Noted: 2023-03-02 | Resolved: 2023-07-13

## 2023-07-13 PROBLEM — R79.89 ELEVATED LFTS: Status: RESOLVED | Noted: 2022-12-17 | Resolved: 2023-07-13

## 2023-07-13 NOTE — PROGRESS NOTES
Subjective:   Patient ID: Pia Murray is a 28year old female. This visit is conducted using Telemedicine with live, interactive video and audio    Diarrhea   This is a chronic problem. The current episode started more than 1 year ago. The problem occurs 2 to 4 times per day. The problem has been waxing and waning. The stool consistency is described as Watery (loose. non bloody). Associated symptoms include abdominal pain (mild bloating) and bloating. Pertinent negatives include no arthralgias, chills, coughing, fever, headaches, increased  flatus, myalgias, sweats, vomiting or weight loss. Nothing aggravates the symptoms. There are no known risk factors. She has tried anti-motility drug for the symptoms. The treatment provided mild relief. hx of dm2 on metformin       History/Other:   Review of Systems   Constitutional:  Negative for chills, fever and weight loss. Respiratory:  Negative for cough. Gastrointestinal:  Positive for abdominal pain (mild bloating), bloating and diarrhea. Negative for flatus and vomiting. Musculoskeletal:  Negative for arthralgias and myalgias. Neurological:  Negative for headaches. All other systems reviewed and are negative. Current Outpatient Medications   Medication Sig Dispense Refill    OLMESARTAN 5 MG Oral Tab TAKE 1 TABLET(5 MG) BY MOUTH DAILY 30 tablet 2    JARDIANCE 10 MG Oral Tab TAKE 1 TABLET(10 MG) BY MOUTH DAILY 90 tablet 0    ARIPiprazole 2 MG Oral Tab Take 1 tablet (2 mg total) by mouth nightly. TAKE AT BEDTIME      drospirenone-ethinyl estradiol (OCELLA) 3-0.03 MG Oral Tab Take 1 tablet by mouth daily. 84 tablet 3    rosuvastatin (CRESTOR) 20 MG Oral Tab Take 1 tablet (20 mg total) by mouth daily. 90 tablet 3    lithium  MG Oral Tab CR Take 2 tablets (900 mg total) by mouth daily. gabapentin 300 MG Oral Cap Take 1 capsule (300 mg total) by mouth 3 (three) times daily as needed (Anxiety).  30 capsule 0    lamoTRIgine 200 MG Oral Tab Take 1 tablet (200 mg total) by mouth nightly. 30 tablet 0     Allergies:  Dander                  ANAPHYLAXIS    Comment:cats  Seasonal                ITCHING    Objective:   Physical Exam  Constitutional:       General: She is not in acute distress. Appearance: Normal appearance.    Pulmonary:      Comments: speaks in full sentences w/o distress            Assessment & Plan:   Chronic diarrhea  (primary encounter diagnosis)  Type 2 diabetes mellitus without complication, without long-term current use of insulin (HCC)  Routine general medical examination at a health care facility  - suspect due to metformin, hold med and see if sxs improvement  - check ct abd to eval abd bloating  Orders Placed This Encounter      CBC With Differential With Platelet      Comp Metabolic Panel (14)      Hemoglobin A1C      Microalb/Creat Ratio, Random Urine      Lipid Panel      Urinalysis, Routine      TSH W Reflex To Free T4      Meds This Visit:  Requested Prescriptions      No prescriptions requested or ordered in this encounter       Imaging & Referrals:  CT ABDOMEN+PELVIS(CONTRAST ONLY)(CPT=[id number])

## 2023-08-28 ENCOUNTER — HOSPITAL ENCOUNTER (OUTPATIENT)
Dept: CT IMAGING | Facility: HOSPITAL | Age: 33
Discharge: HOME | End: 2023-08-28
Attending: INTERNAL MEDICINE
Payer: COMMERCIAL

## 2023-08-28 DIAGNOSIS — R14.0 ABDOMINAL BLOATING: ICD-10-CM

## 2023-08-28 DIAGNOSIS — K52.9 CHRONIC DIARRHEA: ICD-10-CM

## 2023-08-28 LAB
CREAT BLD-MCNC: 0.5 MG/DL
EGFRCR SERPLBLD CKD-EPI 2021: 128 ML/MIN/1.73M2 (ref 60–?)

## 2023-08-28 PROCEDURE — 82565 ASSAY OF CREATININE: CPT

## 2023-08-28 PROCEDURE — 74177 CT ABD & PELVIS W/CONTRAST: CPT | Performed by: INTERNAL MEDICINE

## 2023-08-29 ENCOUNTER — TELEPHONE (OUTPATIENT)
Dept: INTERNAL MEDICINE CLINIC | Facility: CLINIC | Age: 33
End: 2023-08-29

## 2023-08-29 DIAGNOSIS — N30.90 CYSTITIS: Primary | ICD-10-CM

## 2023-08-29 RX ORDER — CEPHALEXIN 500 MG/1
500 CAPSULE ORAL 3 TIMES DAILY
Qty: 21 CAPSULE | Refills: 0 | Status: SHIPPED | OUTPATIENT
Start: 2023-08-29

## 2023-09-12 ENCOUNTER — TELEPHONE (OUTPATIENT)
Dept: INTERNAL MEDICINE CLINIC | Facility: CLINIC | Age: 33
End: 2023-09-12

## 2023-09-12 DIAGNOSIS — N30.90 CYSTITIS: Primary | ICD-10-CM

## 2023-09-12 RX ORDER — LEVOFLOXACIN 500 MG/1
500 TABLET, FILM COATED ORAL DAILY
Qty: 3 TABLET | Refills: 0 | Status: SHIPPED | OUTPATIENT
Start: 2023-09-12

## 2023-09-22 DIAGNOSIS — R80.9 PROTEINURIA, UNSPECIFIED TYPE: ICD-10-CM

## 2023-09-22 RX ORDER — OLMESARTAN MEDOXOMIL 5 MG/1
TABLET ORAL
Qty: 30 TABLET | Refills: 2 | OUTPATIENT
Start: 2023-09-22

## 2023-09-22 RX ORDER — OLMESARTAN MEDOXOMIL 5 MG/1
5 TABLET ORAL DAILY
Qty: 30 TABLET | Refills: 2 | Status: SHIPPED | OUTPATIENT
Start: 2023-09-22

## 2023-09-22 NOTE — TELEPHONE ENCOUNTER
Last time medication was refilled 06/27/2023  Quantity and # of refills 30 w 2  Last OV 07/13/2023  Next OV 10/13/2023    Passed protocol     Drug-Drug Interaction, please review     Sent to Dr. Tita Castro for approval

## 2023-11-13 DIAGNOSIS — E11.9 TYPE 2 DIABETES MELLITUS WITHOUT COMPLICATION, WITHOUT LONG-TERM CURRENT USE OF INSULIN (HCC): ICD-10-CM

## 2023-11-13 RX ORDER — ROSUVASTATIN CALCIUM 20 MG/1
20 TABLET, COATED ORAL DAILY
Qty: 90 TABLET | Refills: 1 | Status: SHIPPED | OUTPATIENT
Start: 2023-11-13

## 2023-11-13 NOTE — TELEPHONE ENCOUNTER
Last time medication was refilled 12/17/2022  Quantity and # of refills 90 w 3  Last OV 07/13/2023  Next OV No appointment scheduled      Passed protocol, Rx sent.    Patient due for physical  to coordinate     My chart message also sent

## 2023-11-27 ENCOUNTER — OFFICE VISIT (OUTPATIENT)
Dept: INTERNAL MEDICINE CLINIC | Facility: CLINIC | Age: 33
End: 2023-11-27

## 2023-11-27 VITALS
HEIGHT: 64.65 IN | RESPIRATION RATE: 20 BRPM | OXYGEN SATURATION: 97 % | TEMPERATURE: 97 F | DIASTOLIC BLOOD PRESSURE: 80 MMHG | SYSTOLIC BLOOD PRESSURE: 120 MMHG | HEART RATE: 111 BPM | WEIGHT: 251 LBS | BODY MASS INDEX: 42.33 KG/M2

## 2023-11-27 DIAGNOSIS — Z01.84 IMMUNITY STATUS TESTING: ICD-10-CM

## 2023-11-27 DIAGNOSIS — E11.9 TYPE 2 DIABETES MELLITUS WITHOUT COMPLICATION, WITHOUT LONG-TERM CURRENT USE OF INSULIN (HCC): ICD-10-CM

## 2023-11-27 DIAGNOSIS — R76.12 POSITIVE QUANTIFERON-TB GOLD TEST: Primary | ICD-10-CM

## 2023-11-27 DIAGNOSIS — Z22.7 LATENT TUBERCULOSIS BY BLOOD TEST: ICD-10-CM

## 2023-11-27 DIAGNOSIS — E11.29 TYPE 2 DIABETES MELLITUS WITH MICROALBUMINURIA, WITHOUT LONG-TERM CURRENT USE OF INSULIN: ICD-10-CM

## 2023-11-27 DIAGNOSIS — R80.9 TYPE 2 DIABETES MELLITUS WITH MICROALBUMINURIA, WITHOUT LONG-TERM CURRENT USE OF INSULIN: ICD-10-CM

## 2023-11-27 PROBLEM — T78.40XA ALLERGY: Status: RESOLVED | Noted: 2018-06-04 | Resolved: 2023-11-27

## 2023-11-27 RX ORDER — TRAZODONE HYDROCHLORIDE 50 MG/1
50 TABLET ORAL NIGHTLY
COMMUNITY
Start: 2023-09-20

## 2023-11-27 RX ORDER — DEXMETHYLPHENIDATE HYDROCHLORIDE 20 MG/1
20 CAPSULE, EXTENDED RELEASE ORAL EVERY MORNING
COMMUNITY
Start: 2023-09-05

## 2023-11-27 RX ORDER — DEXMETHYLPHENIDATE HYDROCHLORIDE 5 MG/1
TABLET ORAL
COMMUNITY
Start: 2023-09-19

## 2023-11-27 RX ORDER — PERPHENAZINE 2 MG/1
TABLET ORAL
COMMUNITY
Start: 2023-11-14

## 2023-11-27 NOTE — PATIENT INSTRUCTIONS
Once insurance is active : Have labs drawn, fasting 8-10 hours prior (water before is ok).     Schedule annual diabetic eye exam

## 2023-12-19 ENCOUNTER — LAB ENCOUNTER (OUTPATIENT)
Dept: LAB | Age: 33
End: 2023-12-19
Attending: PHYSICIAN ASSISTANT

## 2023-12-19 DIAGNOSIS — Z01.84 IMMUNITY STATUS TESTING: ICD-10-CM

## 2023-12-19 LAB
RUBV IGG SER QL: POSITIVE
RUBV IGG SER-ACNC: 80.9 IU/ML (ref 10–?)

## 2023-12-19 PROCEDURE — 86765 RUBEOLA ANTIBODY: CPT | Performed by: PHYSICIAN ASSISTANT

## 2023-12-19 PROCEDURE — 86762 RUBELLA ANTIBODY: CPT | Performed by: PHYSICIAN ASSISTANT

## 2023-12-19 PROCEDURE — 86735 MUMPS ANTIBODY: CPT | Performed by: PHYSICIAN ASSISTANT

## 2023-12-20 LAB
MEV IGG SER-ACNC: >300 AU/ML (ref 16.5–?)
MUV IGG SER IA-ACNC: 292 AU/ML (ref 11–?)

## 2024-12-02 PROBLEM — I15.2 HYPERTENSION ASSOCIATED WITH DIABETES (HCC): Status: ACTIVE | Noted: 2024-12-02

## 2024-12-02 PROBLEM — E11.69 HYPERLIPIDEMIA ASSOCIATED WITH TYPE 2 DIABETES MELLITUS (HCC): Status: ACTIVE | Noted: 2024-12-02

## 2024-12-02 PROBLEM — E78.5 HYPERLIPIDEMIA ASSOCIATED WITH TYPE 2 DIABETES MELLITUS (HCC): Status: ACTIVE | Noted: 2024-12-02

## 2024-12-02 PROBLEM — E11.59 HYPERTENSION ASSOCIATED WITH DIABETES (HCC): Status: ACTIVE | Noted: 2024-12-02

## 2024-12-03 ENCOUNTER — LAB ENCOUNTER (OUTPATIENT)
Dept: LAB | Age: 34
End: 2024-12-03
Attending: INTERNAL MEDICINE
Payer: COMMERCIAL

## 2024-12-03 ENCOUNTER — OFFICE VISIT (OUTPATIENT)
Dept: INTERNAL MEDICINE CLINIC | Facility: CLINIC | Age: 34
End: 2024-12-03
Payer: COMMERCIAL

## 2024-12-03 VITALS
OXYGEN SATURATION: 99 % | BODY MASS INDEX: 47.22 KG/M2 | HEIGHT: 64.5 IN | DIASTOLIC BLOOD PRESSURE: 70 MMHG | HEART RATE: 101 BPM | SYSTOLIC BLOOD PRESSURE: 128 MMHG | TEMPERATURE: 97 F | RESPIRATION RATE: 18 BRPM | WEIGHT: 280 LBS

## 2024-12-03 DIAGNOSIS — E11.59 HYPERTENSION ASSOCIATED WITH DIABETES (HCC): ICD-10-CM

## 2024-12-03 DIAGNOSIS — I15.2 HYPERTENSION ASSOCIATED WITH DIABETES (HCC): ICD-10-CM

## 2024-12-03 DIAGNOSIS — Z00.00 ROUTINE GENERAL MEDICAL EXAMINATION AT A HEALTH CARE FACILITY: ICD-10-CM

## 2024-12-03 DIAGNOSIS — Z00.00 ANNUAL PHYSICAL EXAM: Primary | ICD-10-CM

## 2024-12-03 DIAGNOSIS — G51.0 BELL'S PALSY: ICD-10-CM

## 2024-12-03 DIAGNOSIS — R80.9 TYPE 2 DIABETES MELLITUS WITH MICROALBUMINURIA, WITHOUT LONG-TERM CURRENT USE OF INSULIN (HCC): ICD-10-CM

## 2024-12-03 DIAGNOSIS — E11.29 TYPE 2 DIABETES MELLITUS WITH MICROALBUMINURIA, WITHOUT LONG-TERM CURRENT USE OF INSULIN (HCC): ICD-10-CM

## 2024-12-03 LAB
ALBUMIN SERPL-MCNC: 4.6 G/DL (ref 3.2–4.8)
ALBUMIN/GLOB SERPL: 1.6 {RATIO} (ref 1–2)
ALP LIVER SERPL-CCNC: 94 U/L
ALT SERPL-CCNC: 82 U/L
ANION GAP SERPL CALC-SCNC: 8 MMOL/L (ref 0–18)
AST SERPL-CCNC: 65 U/L (ref ?–34)
BASOPHILS # BLD AUTO: 0.05 X10(3) UL (ref 0–0.2)
BASOPHILS NFR BLD AUTO: 0.4 %
BILIRUB SERPL-MCNC: 0.7 MG/DL (ref 0.3–1.2)
BUN BLD-MCNC: 15 MG/DL (ref 9–23)
CALCIUM BLD-MCNC: 9.6 MG/DL (ref 8.7–10.4)
CHLORIDE SERPL-SCNC: 104 MMOL/L (ref 98–112)
CHOLEST SERPL-MCNC: 167 MG/DL (ref ?–200)
CO2 SERPL-SCNC: 25 MMOL/L (ref 21–32)
CREAT BLD-MCNC: 0.82 MG/DL
CREAT UR-SCNC: 138.3 MG/DL
EGFRCR SERPLBLD CKD-EPI 2021: 96 ML/MIN/1.73M2 (ref 60–?)
EOSINOPHIL # BLD AUTO: 0.02 X10(3) UL (ref 0–0.7)
EOSINOPHIL NFR BLD AUTO: 0.2 %
ERYTHROCYTE [DISTWIDTH] IN BLOOD BY AUTOMATED COUNT: 14.6 %
EST. AVERAGE GLUCOSE BLD GHB EST-MCNC: 123 MG/DL (ref 68–126)
FASTING PATIENT LIPID ANSWER: YES
FASTING STATUS PATIENT QL REPORTED: YES
GLOBULIN PLAS-MCNC: 2.9 G/DL (ref 2–3.5)
GLUCOSE BLD-MCNC: 243 MG/DL (ref 70–99)
HBA1C MFR BLD: 5.9 % (ref ?–5.7)
HCT VFR BLD AUTO: 41.7 %
HDLC SERPL-MCNC: 29 MG/DL (ref 40–59)
HGB BLD-MCNC: 13.3 G/DL
IMM GRANULOCYTES # BLD AUTO: 0.07 X10(3) UL (ref 0–1)
IMM GRANULOCYTES NFR BLD: 0.6 %
LDLC SERPL CALC-MCNC: 47 MG/DL (ref ?–100)
LYMPHOCYTES # BLD AUTO: 1.94 X10(3) UL (ref 1–4)
LYMPHOCYTES NFR BLD AUTO: 16.4 %
MCH RBC QN AUTO: 26.3 PG (ref 26–34)
MCHC RBC AUTO-ENTMCNC: 31.9 G/DL (ref 31–37)
MCV RBC AUTO: 82.4 FL
MICROALBUMIN UR-MCNC: 18.2 MG/DL
MICROALBUMIN/CREAT 24H UR-RTO: 131.6 UG/MG (ref ?–30)
MONOCYTES # BLD AUTO: 0.29 X10(3) UL (ref 0.1–1)
MONOCYTES NFR BLD AUTO: 2.4 %
NEUTROPHILS # BLD AUTO: 9.48 X10 (3) UL (ref 1.5–7.7)
NEUTROPHILS # BLD AUTO: 9.48 X10(3) UL (ref 1.5–7.7)
NEUTROPHILS NFR BLD AUTO: 80 %
NONHDLC SERPL-MCNC: 138 MG/DL (ref ?–130)
OSMOLALITY SERPL CALC.SUM OF ELEC: 293 MOSM/KG (ref 275–295)
PLATELET # BLD AUTO: 356 10(3)UL (ref 150–450)
POTASSIUM SERPL-SCNC: 4.1 MMOL/L (ref 3.5–5.1)
PROT SERPL-MCNC: 7.5 G/DL (ref 5.7–8.2)
RBC # BLD AUTO: 5.06 X10(6)UL
SODIUM SERPL-SCNC: 137 MMOL/L (ref 136–145)
TRIGL SERPL-MCNC: 637 MG/DL (ref 30–149)
TSI SER-ACNC: 2.53 UIU/ML (ref 0.55–4.78)
VLDLC SERPL CALC-MCNC: 90 MG/DL (ref 0–30)
WBC # BLD AUTO: 11.9 X10(3) UL (ref 4–11)

## 2024-12-03 PROCEDURE — 3078F DIAST BP <80 MM HG: CPT | Performed by: INTERNAL MEDICINE

## 2024-12-03 PROCEDURE — 82570 ASSAY OF URINE CREATININE: CPT | Performed by: INTERNAL MEDICINE

## 2024-12-03 PROCEDURE — 82043 UR ALBUMIN QUANTITATIVE: CPT | Performed by: INTERNAL MEDICINE

## 2024-12-03 PROCEDURE — 83036 HEMOGLOBIN GLYCOSYLATED A1C: CPT | Performed by: INTERNAL MEDICINE

## 2024-12-03 PROCEDURE — 3008F BODY MASS INDEX DOCD: CPT | Performed by: INTERNAL MEDICINE

## 2024-12-03 PROCEDURE — 80050 GENERAL HEALTH PANEL: CPT | Performed by: INTERNAL MEDICINE

## 2024-12-03 PROCEDURE — 80061 LIPID PANEL: CPT | Performed by: INTERNAL MEDICINE

## 2024-12-03 PROCEDURE — 3074F SYST BP LT 130 MM HG: CPT | Performed by: INTERNAL MEDICINE

## 2024-12-03 PROCEDURE — 99395 PREV VISIT EST AGE 18-39: CPT | Performed by: INTERNAL MEDICINE

## 2024-12-03 RX ORDER — VALACYCLOVIR HYDROCHLORIDE 1 G/1
1000 TABLET, FILM COATED ORAL 3 TIMES DAILY
COMMUNITY

## 2024-12-03 RX ORDER — OLMESARTAN MEDOXOMIL 5 MG/1
5 TABLET ORAL DAILY
Qty: 90 TABLET | Refills: 1 | Status: SHIPPED | OUTPATIENT
Start: 2024-12-03

## 2024-12-03 RX ORDER — DEXMETHYLPHENIDATE HYDROCHLORIDE 10 MG/1
10 CAPSULE, EXTENDED RELEASE ORAL 2 TIMES DAILY
COMMUNITY

## 2024-12-03 RX ORDER — ROSUVASTATIN CALCIUM 20 MG/1
20 TABLET, COATED ORAL DAILY
Qty: 90 TABLET | Refills: 3 | Status: SHIPPED | OUTPATIENT
Start: 2024-12-03

## 2024-12-03 NOTE — PROGRESS NOTES
Subjective:   Patient ID: Genesis N Duran Magallanes is a 34 year old female.    HPI  HPI:   Genesis N Duran Magallanes is a 34 year old female who presents for a complete physical exam. Symptoms: denies discharge, itching, burning or dysuria, periods are regular. Patient complains of left bell's palasy. Seen in ER few days ago and started on antiviral and prednisone. Sxs still present. Complicated by pt unable to close her right eyelid. 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 this, she is asking to be off work for 2 weeks in hopes that the sxs will improve    PAST MEDICAL, SOCIAL, FAMILY HISTORIES REVIEWED WITH PT    Immunization History   Administered Date(s) Administered    Covid-19 Vaccine Pfizer 30 mcg/0.3 ml 12/24/2020, 01/11/2021    Covid-19 Vaccine Pfizer Thang-Sucrose 30 mcg/0.3 ml 01/11/2022    DTAP 09/19/1991, 10/22/1991, 10/07/1993, 02/06/1996    FLULAVAL 6 months & older 0.5 ml Prefilled syringe (34756) 10/01/2023    FLUZONE 6 months and older PFS 0.5 ml (45140) 09/27/2021    HEP B 07/06/1998, 01/04/1999, 08/22/2001    Influenza 11/01/2022    MMR 04/24/1992, 02/06/1996    OPV 05/04/1991, 06/17/1991, 08/08/1991, 09/09/1992, 02/06/1996    Pneumococcal Conjugate PCV20 02/16/2023    TDAP 09/27/2021, 07/11/2023     Wt Readings from Last 6 Encounters:   11/27/23 251 lb (113.9 kg)   05/16/23 256 lb (116.1 kg)   03/01/23 266 lb 6 oz (120.8 kg)   02/16/23 260 lb (117.9 kg)   10/10/22 290 lb (131.5 kg)   04/11/22 289 lb 8 oz (131.3 kg)     There is no height or weight on file to calculate BMI.     Lab Results   Component Value Date     (H) 03/21/2023     (H) 12/16/2022    GLU 84 09/23/2021     Lab Results   Component Value Date    CHOLEST 102 03/21/2023    CHOLEST 245 (H) 12/16/2022    CHOLEST 179 09/23/2021     Lab Results   Component Value Date    HDL 40 03/21/2023    HDL 27 (L) 12/16/2022    HDL 28 (L) 09/23/2021     Lab Results   Component Value Date    LDL 29  03/21/2023     (H) 12/16/2022     (H) 09/23/2021     Lab Results   Component Value Date    AST 24 03/21/2023     (H) 12/16/2022     (H) 09/23/2021     Lab Results   Component Value Date    ALT 29 03/21/2023     (H) 12/16/2022     (H) 09/23/2021       Current Outpatient Medications   Medication Sig Dispense Refill    Dexmethylphenidate HCl ER 20 MG Oral Capsule SR 24 Hr Take 1 capsule (20 mg total) by mouth every morning.      dexmethylphenidate 5 MG Oral Tab TAKE 1 TABLET BY MOUTH IN THE AFTERNOON FOR ADHD      perphenazine 2 MG Oral Tab       traZODone 50 MG Oral Tab Take 1 tablet (50 mg total) by mouth nightly. TAKE AT BEDTIME      empagliflozin (JARDIANCE) 10 MG Oral Tab Take 1 tablet (10 mg total) by mouth daily. 7 tablet 5    rosuvastatin 20 MG Oral Tab TAKE 1 TABLET(20 MG) BY MOUTH DAILY 90 tablet 1    olmesartan 5 MG Oral Tab Take 1 tablet (5 mg total) by mouth daily. 30 tablet 2    ARIPiprazole 2 MG Oral Tab Take 1 tablet (2 mg total) by mouth nightly. TAKE AT BEDTIME      lithium  MG Oral Tab CR Take 2 tablets (900 mg total) by mouth daily.      gabapentin 300 MG Oral Cap Take 1 capsule (300 mg total) by mouth 3 (three) times daily as needed (Anxiety). 30 capsule 0    lamoTRIgine 200 MG Oral Tab Take 1 tablet (200 mg total) by mouth nightly. 30 tablet 0      Past Medical History:    Diabetes (HCC)    Hyperlipidemia    Hypertension associated with diabetes (HCC)    Meningitis (HCC)    Obesity    DEVEN (obstructive sleep apnea)    AHI 11.7 REM AHI 13.5 Supine AHI 0 non-supine AHI 12.2 Sao2 Callum 85.4%       Past Surgical History:   Procedure Laterality Date    Hysteroscopy  06/08/2023    polypectomy    Prior myomectomy  06/08/2023    Tonsillectomy        Family History   Problem Relation Age of Onset    Diabetes Mother     Diabetes Maternal Grandmother     Hypertension Maternal Grandmother     Cancer Maternal Grandmother     Cancer Maternal Uncle     Hypertension  Maternal Grandfather     Hypertension Paternal Grandmother     Hypertension Paternal Grandfather       Social History:   Social History     Socioeconomic History    Marital status:    Tobacco Use    Smoking status: Never    Smokeless tobacco: Never    Tobacco comments:     pt vapes nicotine   Vaping Use    Vaping status: Every Day    Start date: 1/14/2019    Substances: Nicotine, Flavoring    Devices: Refillable tank    Passive vaping exposure: Yes   Substance and Sexual Activity    Alcohol use: Yes     Alcohol/week: 1.0 standard drink of alcohol     Types: 1 Cans of beer per week     Comment: rare    Drug use: Yes     Types: Cannabis     Comment: Edibles Occ     .   Exercise: minimal.  Diet: watches sugar closely     REVIEW OF SYSTEMS:   A comprehensive 10 point review of systems was completed.     Pertinent positives and negatives noted in the HPI.      EXAM:   There were no vitals taken for this visit.  There is no height or weight on file to calculate BMI.   GENERAL: well developed, well nourished,in no apparent distress  SKIN: no rashes,no suspicious lesions  HEENT: atraumatic, normocephalic,ears and throat are clear. Left facial droop  EYES:PERRLA, EOMI,conjunctiva are clear, right upper eyelid unable to close unless manually closing  NECK: supple,no adenopathy,no bruits  LUNGS: clear to auscultation  CARDIO: RRR without murmur  GI: good BS's,no masses, HSM or tenderness   deferred  MUSCULOSKELETAL: back is not tender,  EXTREMITIES: no cyanosis, clubbing or edema  NEURO: Oriented times three,motor and sensory are grossly intact, Bilateral barefoot skin diabetic exam is normal, visualized feet and the appearance is normal.  Bilateral monofilament/sensation of both feet is normal.  Pulsation pedal pulse exam of both lower legs/feet is normal as well.        ASSESSMENT AND PLAN:   Genesis N Duran Magallanes is a 34 year old female who presents for a complete physical exam.  Pap and pelvic done by gyne.    Self breast exam explained.   Health maintenance, will check fasting Lipids, CMP, and CBC and A1c  Type 2 diabetes mellitus with microalbuminuria, with  and Hypertension associated with diabetes (HCC) which are well controlled     Bell's palsy- finish prednisone burst and antiviral. Off work fro 2 weeks due to upper eyelid paralysis/. Reassess in 2 weeks    Pt info handouts given for: exercise, low fat diet   There is no height or weight on file to calculate BMI., recommended low fat diet and aerobic exercise 30 minutes three times weekly.     The patient indicates understanding of these issues and agrees to the plan.  The patient is asked to return in 2 weeks  History/Other:   Review of Systems  Current Outpatient Medications   Medication Sig Dispense Refill    Dexmethylphenidate HCl ER 20 MG Oral Capsule SR 24 Hr Take 1 capsule (20 mg total) by mouth every morning.      dexmethylphenidate 5 MG Oral Tab TAKE 1 TABLET BY MOUTH IN THE AFTERNOON FOR ADHD      perphenazine 2 MG Oral Tab       traZODone 50 MG Oral Tab Take 1 tablet (50 mg total) by mouth nightly. TAKE AT BEDTIME      empagliflozin (JARDIANCE) 10 MG Oral Tab Take 1 tablet (10 mg total) by mouth daily. 7 tablet 5    rosuvastatin 20 MG Oral Tab TAKE 1 TABLET(20 MG) BY MOUTH DAILY 90 tablet 1    olmesartan 5 MG Oral Tab Take 1 tablet (5 mg total) by mouth daily. 30 tablet 2    ARIPiprazole 2 MG Oral Tab Take 1 tablet (2 mg total) by mouth nightly. TAKE AT BEDTIME      lithium  MG Oral Tab CR Take 2 tablets (900 mg total) by mouth daily.      gabapentin 300 MG Oral Cap Take 1 capsule (300 mg total) by mouth 3 (three) times daily as needed (Anxiety). 30 capsule 0    lamoTRIgine 200 MG Oral Tab Take 1 tablet (200 mg total) by mouth nightly. 30 tablet 0     Allergies:Allergies[1]    Objective:   Physical Exam    Assessment & Plan:   1. Annual physical exam    2. Type 2 diabetes mellitus with microalbuminuria, without long-term current use of insulin  (HCC)    3. Hypertension associated with diabetes (HCC)        No orders of the defined types were placed in this encounter.      Meds This Visit:  Requested Prescriptions      No prescriptions requested or ordered in this encounter       Imaging & Referrals:  None         [1]   Allergies  Allergen Reactions    Dander ANAPHYLAXIS     cats    Dust ITCHING     Itchy nose, itchy eyes, sneezing    Seasonal ITCHING

## 2024-12-04 DIAGNOSIS — R79.89 ELEVATED LFTS: Primary | ICD-10-CM

## 2024-12-05 ENCOUNTER — TELEPHONE (OUTPATIENT)
Dept: INTERNAL MEDICINE CLINIC | Facility: CLINIC | Age: 34
End: 2024-12-05

## 2024-12-05 DIAGNOSIS — G51.0 BELL'S PALSY: Primary | ICD-10-CM

## 2024-12-05 RX ORDER — GABAPENTIN 100 MG/1
100 CAPSULE ORAL 2 TIMES DAILY
Qty: 60 CAPSULE | Refills: 0 | Status: SHIPPED | OUTPATIENT
Start: 2024-12-05 | End: 2024-12-12

## 2024-12-05 NOTE — TELEPHONE ENCOUNTER
Spoke with Patient, stated she has pain on the right side of her face, starting from behind the ear and coming to face, constant ranges from 4-5/10 to 7-8/10 intensity, feels like nerve pain  Per Dr. Neri ok to send Gabapentin 100 mg twice daily  Patient notified, voiced understanding and agreed with the plan  Rx sent

## 2024-12-05 NOTE — TELEPHONE ENCOUNTER
Patient discussed dr rader w/ insurance company    Forgot to mention she is experiencing pain from her bells palsy causing her to be unable to sleep well.     ER told her she could alternate tyenol and Ibprofin but it isnt working.    Please advise treatment options   Please call to discuss

## 2024-12-12 ENCOUNTER — OFFICE VISIT (OUTPATIENT)
Dept: NEUROLOGY | Facility: CLINIC | Age: 34
End: 2024-12-12
Payer: COMMERCIAL

## 2024-12-12 VITALS — BODY MASS INDEX: 46.65 KG/M2 | WEIGHT: 280 LBS | HEIGHT: 65 IN

## 2024-12-12 DIAGNOSIS — G51.0 RIGHT-SIDED BELL'S PALSY: Primary | ICD-10-CM

## 2024-12-12 PROCEDURE — 99204 OFFICE O/P NEW MOD 45 MIN: CPT | Performed by: INTERNAL MEDICINE

## 2024-12-12 PROCEDURE — 3008F BODY MASS INDEX DOCD: CPT | Performed by: INTERNAL MEDICINE

## 2024-12-12 NOTE — PROGRESS NOTES
[facility] NEUROSCIENCES [address], [address]  [facility] [address]  [phone]            Neurology Initial Visit     Referred By: Dr. Doyle ref. provider found    Chief Complaint:   Chief Complaint   Patient presents with    Neurologic Problem     New patient presents with [place] .Palsy right side of face. Pt was seen in ED 11/30/2024, at [facility]. Pt was prescribed Prednisone, and Valacylcovir 1 mg one tablet three times a day. She has completed both medications.       HPI:     Genesis N Duran Magallanes is a 34 year old female with history of diabetes, hyperlipidemia, DEVEN, who presents for evaluation of right-sided Bell's palsy.  Started on 11/30 and she went to the ED that same day and was given prednisone and valacyclovir.  MRI not done.  Since then, she has had some mild improvement but no worsening.  Main improvement has been that she has better eye closure now than before.  She is taping the eye at night, having lots of tearing but no irritation.  Using straw to drink fluids.  Main concern is that she has been having pain behind the right ear.  During the daytime it is a 3 or 4 and not that bothersome, but at night when she tries to sleep it is very severe and throbbing.  She has been up till 4 in the morning because she cannot fall asleep due to this pain.  She also feels a pain in her entire right face at night when she tries to sleep.  Also describes this as a throbbing with extreme sensitivity to touch.  She cannot lay on her right side because of this.  Tried gabapentin, ibuprofen, Tylenol, drowsy over-the-counter meds with no improvement    Past Medical History:    Diabetes (HCC)    Hyperlipidemia    Hypertension associated with diabetes (HCC)    Meningitis (HCC)    Obesity    DEVEN (obstructive sleep apnea)    AHI 11.7 REM AHI 13.5 Supine AHI 0 non-supine AHI 12.2 Sao2 Callum 85.4%        Past Surgical History:   Procedure Laterality Date    Hysteroscopy   06/08/2023    polypectomy    Prior myomectomy  06/08/2023    Tonsillectomy         Social history:  History   Smoking Status    Never   Smokeless Tobacco    Never     History   Alcohol Use    1.0 standard drink of alcohol/week    1 Cans of beer per week     Comment: rare     History   Drug Use    Types: Cannabis     Comment: Edibles Occ       Family History   Problem Relation Age of Onset    Diabetes Mother     Diabetes Maternal Grandmother     Hypertension Maternal Grandmother     Cancer Maternal Grandmother     Cancer Maternal Uncle     Hypertension Maternal Grandfather     Hypertension Paternal Grandmother     Hypertension Paternal Grandfather          Current Outpatient Medications:     amitriptyline 25 MG Oral Tab, Take 1 tablet (25 mg total) by mouth nightly., Disp: 90 tablet, Rfl: 3    Dexmethylphenidate HCl ER 10 MG Oral Capsule SR 24 Hr, Take 1 capsule (10 mg total) by mouth in the morning and 1 capsule (10 mg total) before bedtime., Disp: , Rfl:     valACYclovir 1 G Oral Tab, Take 1 tablet (1,000 mg total) by mouth in the morning, at noon, and at bedtime., Disp: , Rfl:     rosuvastatin 20 MG Oral Tab, Take 1 tablet (20 mg total) by mouth daily., Disp: 90 tablet, Rfl: 3    olmesartan 5 MG Oral Tab, Take 1 tablet (5 mg total) by mouth daily., Disp: 90 tablet, Rfl: 1    empagliflozin (JARDIANCE) 10 MG Oral Tab, Take 1 tablet (10 mg total) by mouth daily., Disp: 90 tablet, Rfl: 1    ARIPiprazole 2 MG Oral Tab, Take 1 tablet (2 mg total) by mouth nightly. TAKE AT BEDTIME, Disp: , Rfl:     lithium  MG Oral Tab CR, Take 2 tablets (900 mg total) by mouth daily., Disp: , Rfl:     lamoTRIgine 200 MG Oral Tab, Take 1 tablet (200 mg total) by mouth nightly., Disp: 30 tablet, Rfl: 0    Allergies[1]    ROS:   As in HPI, the rest of the 14 system review was done and was negative      Physical Exam:  Vitals:    12/12/24 1124   Weight: 280 lb (127 kg)   Height: 65\"       General: No apparent distress, well nourished,  well groomed.  Head- Normocephalic, atraumatic  Eyes- No redness or swelling    Neurological:   Mental Status:  Mental Status- Alert, conversant, speech fluent, following all commands, Fund of knowledge appropriate for education and age, and Thought process intact    Cranial Nerves:  II.- Visual fields full to confrontation, III, IV, VI- EOM intact, V. Facial sensation intact, VII. Face symmetric, no facial weakness, and VIII. Hearing intact to whisper.    Motor Exam:  Strength- upper extremities 5/5 proximally and distally                - lower  extremities 5/5 proximally and distally    Sensory Exam:  Pinprick- intact in all 4 extremities    Deep Tendon Reflexes:  1+ BUE, absent BLE     Coordination:  No dysmetria with finger to nose bilaterally    Gait:  Normal casual gait    Labs:    Lab Results   Component Value Date    TSH 2.530 12/03/2024     Lab Results   Component Value Date    HDL 29 (L) 12/03/2024    LDL 47 12/03/2024    TRIG 637 (H) 12/03/2024     Lab Results   Component Value Date    HGB 13.3 12/03/2024    HCT 41.7 12/03/2024    MCV 82.4 12/03/2024    WBC 11.9 (H) 12/03/2024    .0 12/03/2024      Lab Results   Component Value Date    BUN 15 12/03/2024    CA 9.6 12/03/2024    ALT 82 (H) 12/03/2024    AST 65 (H) 12/03/2024    ALB 4.6 12/03/2024     12/03/2024    K 4.1 12/03/2024     12/03/2024    CO2 25.0 12/03/2024      I have reviewed labs.    Imaging Studies:  I have independently reviewed imaging.      Assessment   Genesis N Duran Magallanes is a 34 year old female with history of diabetes, hyperlipidemia, DEVEN, who presents for evaluation of right-sided Bell's palsy    1. Right-sided Bell's palsy  - MRI BRAIN (W+WO) (CPT=70553); Future  - amitriptyline 25 MG Oral Tab; Take 1 tablet (25 mg total) by mouth nightly.  Dispense: 90 tablet; Refill: 3  -Having pretty severe retroauricular and facial pains at night when sleeping.  This has been reported with Bell's palsy and is likely  related to facial nerve involvement.  -Tried gabapentin with no improvement  Could be some trigeminal nerve crossover to suggest use of carbamazepine, but given that symptoms are only at night and impairing sleep will try amitriptyline first to see if this helps her get through the night with sleep.  Side effects discussed.  If amitriptyline is not effective we will try carbamazepine instead  -MRI brain if symptoms do not improve.         Education and counseling provided to patient. Instructed patient to call my office or seek medical attention immediately if symptoms worsen.  Patient verbalized understanding of information given. All questions were answered. All side effects of drugs were discussed.     Return to clinic in: Return in about 3 months (around 3/12/2025).    Jenny Barnett MD         [1]   Allergies  Allergen Reactions    Dander ANAPHYLAXIS     cats    Dust ITCHING     Itchy nose, itchy eyes, sneezing    Seasonal ITCHING

## 2024-12-17 ENCOUNTER — OFFICE VISIT (OUTPATIENT)
Dept: INTERNAL MEDICINE CLINIC | Facility: CLINIC | Age: 34
End: 2024-12-17
Payer: COMMERCIAL

## 2024-12-17 VITALS
HEART RATE: 81 BPM | RESPIRATION RATE: 16 BRPM | SYSTOLIC BLOOD PRESSURE: 118 MMHG | WEIGHT: 282 LBS | TEMPERATURE: 98 F | DIASTOLIC BLOOD PRESSURE: 78 MMHG | OXYGEN SATURATION: 99 % | HEIGHT: 65 IN | BODY MASS INDEX: 46.98 KG/M2

## 2024-12-17 DIAGNOSIS — G51.0 BELL'S PALSY: Primary | ICD-10-CM

## 2024-12-17 PROCEDURE — 3078F DIAST BP <80 MM HG: CPT | Performed by: INTERNAL MEDICINE

## 2024-12-17 PROCEDURE — 3060F POS MICROALBUMINURIA REV: CPT | Performed by: INTERNAL MEDICINE

## 2024-12-17 PROCEDURE — 3008F BODY MASS INDEX DOCD: CPT | Performed by: INTERNAL MEDICINE

## 2024-12-17 PROCEDURE — 3061F NEG MICROALBUMINURIA REV: CPT | Performed by: INTERNAL MEDICINE

## 2024-12-17 PROCEDURE — 3074F SYST BP LT 130 MM HG: CPT | Performed by: INTERNAL MEDICINE

## 2024-12-17 PROCEDURE — 99213 OFFICE O/P EST LOW 20 MIN: CPT | Performed by: INTERNAL MEDICINE

## 2024-12-17 PROCEDURE — 3044F HG A1C LEVEL LT 7.0%: CPT | Performed by: INTERNAL MEDICINE

## 2024-12-17 NOTE — PROGRESS NOTES
Subjective:   Patient ID: Genesis N Duran Magallanes is a 34 year old female.    Bell's Palsy      Follow up on bell's palsy  Has not improved  Still needs to be off work fore 2 more weeks due to right eye not closing  History/Other:   Review of Systems   All other systems reviewed and are negative.    Current Outpatient Medications   Medication Sig Dispense Refill    glimepiride 2 MG Oral Tab Take 1 tablet (2 mg total) by mouth every morning before breakfast. 90 tablet 3    olmesartan 5 MG Oral Tab Take 1 tablet (5 mg total) by mouth daily. 90 tablet 1    rosuvastatin 20 MG Oral Tab Take 1 tablet (20 mg total) by mouth daily. 90 tablet 3    amitriptyline 25 MG Oral Tab Take 1 tablet (25 mg total) by mouth nightly. 90 tablet 3    Dexmethylphenidate HCl ER 10 MG Oral Capsule SR 24 Hr Take 1 capsule (10 mg total) by mouth in the morning and 1 capsule (10 mg total) before bedtime.      valACYclovir 1 G Oral Tab Take 1 tablet (1,000 mg total) by mouth in the morning, at noon, and at bedtime.      lithium  MG Oral Tab CR Take 2 tablets (900 mg total) by mouth daily.      lamoTRIgine 200 MG Oral Tab Take 1 tablet (200 mg total) by mouth nightly. 30 tablet 0     Allergies:Allergies[1]    Objective:   Physical Exam  Vitals and nursing note reviewed.   Constitutional:       Appearance: Normal appearance.   HENT:      Head: Head contusion: right facial droop.   Cardiovascular:      Rate and Rhythm: Normal rate and regular rhythm.   Neurological:      Mental Status: She is alert.         Assessment & Plan:   1. Bell's palsy      - off work for 2 more weeks  No orders of the defined types were placed in this encounter.      Meds This Visit:  Requested Prescriptions      No prescriptions requested or ordered in this encounter       Imaging & Referrals:  None         [1]   Allergies  Allergen Reactions    Dander ANAPHYLAXIS     cats    Dust ITCHING     Itchy nose, itchy eyes, sneezing    Seasonal ITCHING

## 2024-12-19 ENCOUNTER — MED REC SCAN ONLY (OUTPATIENT)
Dept: INTERNAL MEDICINE CLINIC | Facility: CLINIC | Age: 34
End: 2024-12-19

## 2025-01-10 ENCOUNTER — TELEPHONE (OUTPATIENT)
Dept: INTERNAL MEDICINE CLINIC | Facility: CLINIC | Age: 35
End: 2025-01-10

## 2025-01-10 NOTE — TELEPHONE ENCOUNTER
PATIENT DROPPED OFF FML PAPERWORK    FORMS SENT TO FORMS DEPARTMENT VIA EMAIL & INTEROFFICE MAIL

## 2025-01-15 NOTE — TELEPHONE ENCOUNTER
Family Medical Leave Act forms received via email from office. No valid Release of Information on file. Sending [employer] message for Release of Information. Logged for processing.

## 2025-01-20 NOTE — TELEPHONE ENCOUNTER
Dr. Neri,    *ACKNOWLEDGE BUTTON HAS BEEN MOVED TO THE TOP RIGHT RIBBON*    Please sign off on form if you agree to: Family Medical Leave Act due to Bell's Palasy    -Signature page will be the first page scanned  -From your Inbasket, Highlight the patient and click Chart   -Double click the 1/10/25 Forms Completion telephone encounter  -Scroll down to the Media section   -Click the blue Hyperlink: Family Medical Leave Act Dr. Neri 1/20/2025  -Click Acknowledge located in the top right ribbon/menu   -Drag the mouse into the blank space of the document and a + sign will appear. Left click to   electronically sign the document.  -Once signed, simply exit out of the screen and you signature will be saved.     Thank you,    Vivian

## 2025-01-21 NOTE — TELEPHONE ENCOUNTER
Completed forms sent to patient via [employer] due to no valid Release of Information on file unable to fax.

## (undated) DEVICE — DEVICE V-LOC 180 9IN 3-0 GS-21 ABS GRN CLSR

## (undated) DEVICE — OBTURATOR LAPSCP 8MM WECK VISTA DISP BLDLS STRL LF

## (undated) DEVICE — DEVICE ENDO INSFL GAS COND STRL LF DISP

## (undated) DEVICE — NEEDLE INSFL 14GA 120MM STD SPRG LOAD BLUNT STY STRL LF DISP

## (undated) DEVICE — REDUCER LAPSCP 8-12MM DA VINCI XI EWRST CANNULA

## (undated) DEVICE — REMOVER PREP SOL .5OZ LOTION PKT SKIN DRPRP

## (undated) DEVICE — SET TBG INFL HYSTEROLUX HYSCP

## (undated) DEVICE — SUT PDS II 3-0 CT-2 Z332H

## (undated) DEVICE — SUTURE PRMHND 2-0 KS 30IN SILK BRAID NABSB BLK 623H

## (undated) DEVICE — Device

## (undated) DEVICE — MORCELLATOR LAPSCP LINA XCISE CRDLS SMPL PRCS SAFE LTWT 15MM

## (undated) DEVICE — BLANKET WRM UPR BODY ADULT 74X24IN BR HGR PLMR 2 HOSE PORT

## (undated) DEVICE — DRAPE CLMN EQUIPMENT DA VINCI XI

## (undated) DEVICE — TROCAR LAPSCP 150MM 5MM KII ADV FX SLV SHLD BLADE STRL LF

## (undated) DEVICE — DRAPE ARM 21X19X10.5IN EQUIPMENT DA VINCI XI 21LB

## (undated) DEVICE — DEVICE V-LOC 180 23CM 2-0 GS-21 37MM ABS GRN CLSR

## (undated) DEVICE — PAD ABD 8X7.5IN CELLULOSE ABS NONWOVEN SEAL EDGE LF STRL

## (undated) DEVICE — SEAL CANNULA 12MM EWRST STPLR DA VINCI XI

## (undated) DEVICE — NEEDLE SPNL 18GA 6IN LONG LGTH REG WALL QUINCKE TIP STRL LF

## (undated) DEVICE — EVACUATOR SMOKE ELIMINATION PSHBTN QUIET PNEUVIEW XE STRL

## (undated) DEVICE — GOWN SURG XL L3 NONREINFORCE SET IN SLV STRL LF DISP BLUE

## (undated) DEVICE — BAG RETRVL ECOSAC 340MM

## (undated) DEVICE — TROCAR LAPSCP 100MM 12MM EPTH XCEL STAB SLV BLDLS OBT OPTC

## (undated) DEVICE — PAD DRSG 8X3IN CRD POLY CTN ABS PERFORATE FILM LF STRL

## (undated) DEVICE — SYRINGE 20ML GRAD STRL MED DISP LL

## (undated) DEVICE — SET TBG OFLW HYSCP

## (undated) DEVICE — REGULATOR SCT 32.48CM EWRST 1.2CM

## (undated) DEVICE — SUTURE MONOCRYL MTPS 4-0 PS2 18IN MONO ABS UNDYED

## (undated) DEVICE — SUTURE VICRYL 0 UR-6 27IN BRAID COAT ABS VIOL J603H

## (undated) DEVICE — TIP ELECTROCAUTERY HOT SHR DA VINCI EWRST 8MM STD CAUT MNPLR

## (undated) DEVICE — ADVANCED TRENDELENBURG POSITIONING SYSTEM

## (undated) DEVICE — ELECTRODE PT RTN C30- LB 9FT CORD NONIRRITATE NONSENSITIZE

## (undated) DEVICE — GLOVE SURG 6 PROTEXIS LF CRM PF BEAD CUFF STRL PLISPRN 11.3

## (undated) DEVICE — INSERT SCSR CLKLN CRV 36CM 5MM OUTER SHTH SPN 2 ACT JAW 17MM

## (undated) DEVICE — SUTURE VICRYL 0 54IN BRAID TIES COAT ABS VIOL J616H

## (undated) DEVICE — SEAL ENDO INST 5-8MM DA VINCI XI UNV

## (undated) DEVICE — SOLUTION ANTIFOG FLUID RADOPQ SOL SPNG LF

## (undated) NOTE — LETTER
Date: 12/17/2024    Patient Name: Genesis N Duran Magallanes          To Whom it may concern:    The above patient was seen at [facility] for treatment of a medical condition.    This patient should be excused from attending work from 12/17/2024 through 1/7/2025.    The patient return to work will be determine on 1/7/2025        Sincerely,    Fidel Neri MD

## (undated) NOTE — LETTER
Date: 12/3/2024    Patient Name: Genesis N Duran Magallanes          To Whom it may concern:    The above patient was seen at [facility] for treatment of a medical condition.    This patient should be excused from attending work from 12/4/2024 through 12/18/2024.    The patient may return to be determined on 12/18/2024        Sincerely,    Fidel Neri MD

## (undated) NOTE — LETTER
Date: 4/11/2022    Patient Name: Yassine Colindres  11/28/1990      To Whom it may concern: The above patient was seen at the [facility] for treatment of a medical condition. The patient may return to school on April 11, 2022 with no restrictions.      Sincerely,    GERALD Moss